# Patient Record
Sex: FEMALE | Race: WHITE
[De-identification: names, ages, dates, MRNs, and addresses within clinical notes are randomized per-mention and may not be internally consistent; named-entity substitution may affect disease eponyms.]

---

## 2018-12-03 ENCOUNTER — HOSPITAL ENCOUNTER (OUTPATIENT)
Dept: HOSPITAL 62 - END | Age: 38
Discharge: HOSPICE | End: 2018-12-03
Attending: INTERNAL MEDICINE
Payer: OTHER GOVERNMENT

## 2018-12-03 VITALS — DIASTOLIC BLOOD PRESSURE: 82 MMHG | SYSTOLIC BLOOD PRESSURE: 147 MMHG

## 2018-12-03 DIAGNOSIS — R19.4: ICD-10-CM

## 2018-12-03 DIAGNOSIS — K92.1: ICD-10-CM

## 2018-12-03 DIAGNOSIS — R10.13: Primary | ICD-10-CM

## 2018-12-03 PROCEDURE — 88342 IMHCHEM/IMCYTCHM 1ST ANTB: CPT

## 2018-12-03 PROCEDURE — 43239 EGD BIOPSY SINGLE/MULTIPLE: CPT

## 2018-12-03 PROCEDURE — 45380 COLONOSCOPY AND BIOPSY: CPT

## 2018-12-03 PROCEDURE — 88305 TISSUE EXAM BY PATHOLOGIST: CPT

## 2018-12-03 NOTE — OPERATIVE REPORT
Operative Report


DATE OF SURGERY: 12/03/18


Operative Report: 





The risks, benefits and alternatives of the procedure including the risks of 

bleeding, perforation requiring surgery are explained to the patient in detail 

and informed consent was obtained.  The patient is taken back to the endoscopy 

unit and placed in the left, lateral decubital position.  Timeout was called.  

Propofol medications administered.  A rectal examination is done which did not 

reveal any masses, tears or fissures.  An Olympus videoscope was introduced 

into the patient's rectum.  The scope was then carefully advanced all the way 

to the cecum.  The cecum was identified by the usual anatomical landmarks 

including the ileocecal valve as well as the appendiceal office.  

Photodocumentation is obtained.  The scope was then sequentially pulled back 

via the various segments of the colon including the ascending colon, hepatic 

flexure, transverse colon, splenic flexure, descending colon and finally into 

the rectosigmoid portions of the colon.  Retroflexion maneuver was performed.


The risks benefits and alternatives of the procedure explained to the patient 

in detail and informed consent is obtained.A GIF Olympus video scope was 

inserted into the patient's mouth and hypopharynx, the esophagus is identified 

intubated and insufflated, the scope was then advanced through the esophagus 

stomach and duodenum ,retroflexion maneuver is done the esophagus stomach and 

first and second portions of the duodenum examined


PREOPERATIVE DIAGNOSIS: Change in bowel habits.  Blood in stool.  Dyspepsia, 

epigastric pain rule out peptic ulcer disease


POSTOPERATIVE DIAGNOSIS: Right colon inflammation status post biopsy without 

lymphocytic, microscopic, collagenous colitis.  Internal hemorrhoids.  

Gastritis status post biopsy rule out Helicobacter pylori


OPERATION: Colonoscopy with biopsy.  EGD with biopsy


SURGEON: JOSE M MEDLEY


ANESTHESIA: LMAC


TISSUE REMOVED OR ALTERED: As noted above


COMPLICATIONS: 





None.


ESTIMATED BLOOD LOSS: None.


INTRAOPERATIVE FINDINGS: As noted above.


PROCEDURE: 





Patient tolerated the procedure well.


No immediate postprocedure complications are noted.


Patient discharged in good condition.


Discharge date 12/3/2018.


Discharge diet: Regular.


Discharge activity: Regular.


2-3-week follow-up to discuss findings.


Patient is instructed to call the office or proceed to the emergency room 

should there be any further problems or questions.


Wait on the pathology.